# Patient Record
Sex: MALE | Race: WHITE | NOT HISPANIC OR LATINO
[De-identification: names, ages, dates, MRNs, and addresses within clinical notes are randomized per-mention and may not be internally consistent; named-entity substitution may affect disease eponyms.]

---

## 2017-11-09 ENCOUNTER — FORM ENCOUNTER (OUTPATIENT)
Age: 27
End: 2017-11-09

## 2017-11-10 ENCOUNTER — APPOINTMENT (OUTPATIENT)
Dept: UROLOGY | Facility: CLINIC | Age: 27
End: 2017-11-10
Payer: MEDICARE

## 2017-11-10 VITALS — OXYGEN SATURATION: 97 % | DIASTOLIC BLOOD PRESSURE: 58 MMHG | HEART RATE: 73 BPM | SYSTOLIC BLOOD PRESSURE: 104 MMHG

## 2017-11-10 DIAGNOSIS — N39.41 URGE INCONTINENCE: ICD-10-CM

## 2017-11-10 PROCEDURE — 99204 OFFICE O/P NEW MOD 45 MIN: CPT

## 2017-11-11 PROBLEM — N39.41 URGE INCONTINENCE OF URINE: Status: ACTIVE | Noted: 2017-11-11

## 2017-11-13 ENCOUNTER — APPOINTMENT (OUTPATIENT)
Dept: ULTRASOUND IMAGING | Facility: CLINIC | Age: 27
End: 2017-11-13

## 2017-11-13 LAB — BACTERIA UR CULT: NORMAL

## 2018-01-23 ENCOUNTER — APPOINTMENT (OUTPATIENT)
Dept: SURGERY | Facility: CLINIC | Age: 28
End: 2018-01-23
Payer: MEDICARE

## 2018-01-23 VITALS
TEMPERATURE: 97.8 F | HEART RATE: 75 BPM | DIASTOLIC BLOOD PRESSURE: 61 MMHG | SYSTOLIC BLOOD PRESSURE: 95 MMHG | RESPIRATION RATE: 16 BRPM | OXYGEN SATURATION: 98 %

## 2018-01-23 PROCEDURE — 46600 DIAGNOSTIC ANOSCOPY SPX: CPT

## 2018-01-23 PROCEDURE — 99214 OFFICE O/P EST MOD 30 MIN: CPT | Mod: 25

## 2018-01-23 RX ORDER — DOCUSATE SODIUM 100 MG/1
CAPSULE ORAL
Refills: 0 | Status: ACTIVE | COMMUNITY

## 2018-03-27 ENCOUNTER — CHART COPY (OUTPATIENT)
Age: 28
End: 2018-03-27

## 2018-05-08 ENCOUNTER — APPOINTMENT (OUTPATIENT)
Dept: ORTHOPEDIC SURGERY | Facility: CLINIC | Age: 28
End: 2018-05-08

## 2019-03-19 ENCOUNTER — APPOINTMENT (OUTPATIENT)
Dept: MRI IMAGING | Facility: CLINIC | Age: 29
End: 2019-03-19

## 2019-03-20 ENCOUNTER — APPOINTMENT (OUTPATIENT)
Dept: MRI IMAGING | Facility: IMAGING CENTER | Age: 29
End: 2019-03-20
Payer: MEDICARE

## 2019-03-20 ENCOUNTER — OUTPATIENT (OUTPATIENT)
Dept: OUTPATIENT SERVICES | Facility: HOSPITAL | Age: 29
LOS: 1 days | End: 2019-03-20
Payer: MEDICARE

## 2019-03-20 DIAGNOSIS — Z00.8 ENCOUNTER FOR OTHER GENERAL EXAMINATION: ICD-10-CM

## 2019-03-20 PROCEDURE — 73721 MRI JNT OF LWR EXTRE W/O DYE: CPT

## 2019-03-20 PROCEDURE — 73721 MRI JNT OF LWR EXTRE W/O DYE: CPT | Mod: 26,RT

## 2020-04-07 ENCOUNTER — APPOINTMENT (OUTPATIENT)
Dept: GASTROENTEROLOGY | Facility: CLINIC | Age: 30
End: 2020-04-07

## 2020-05-18 ENCOUNTER — APPOINTMENT (OUTPATIENT)
Dept: GASTROENTEROLOGY | Facility: CLINIC | Age: 30
End: 2020-05-18
Payer: MEDICARE

## 2020-05-18 PROCEDURE — 99443: CPT | Mod: 95

## 2021-12-16 ENCOUNTER — APPOINTMENT (OUTPATIENT)
Dept: OTOLARYNGOLOGY | Facility: CLINIC | Age: 31
End: 2021-12-16
Payer: MEDICARE

## 2021-12-16 VITALS
SYSTOLIC BLOOD PRESSURE: 100 MMHG | BODY MASS INDEX: 23.62 KG/M2 | HEIGHT: 70 IN | HEART RATE: 70 BPM | WEIGHT: 165 LBS | DIASTOLIC BLOOD PRESSURE: 64 MMHG

## 2021-12-16 DIAGNOSIS — H61.23 IMPACTED CERUMEN, BILATERAL: ICD-10-CM

## 2021-12-16 DIAGNOSIS — H93.299 OTHER ABNORMAL AUDITORY PERCEPTIONS, UNSPECIFIED EAR: ICD-10-CM

## 2021-12-16 PROCEDURE — 69210 REMOVE IMPACTED EAR WAX UNI: CPT

## 2021-12-16 PROCEDURE — 99204 OFFICE O/P NEW MOD 45 MIN: CPT | Mod: 25

## 2021-12-16 NOTE — PHYSICAL EXAM
[FreeTextEntry1] : non-verbal [de-identified] : CI AU  [de-identified] : not seen [Normal] : mucosa is normal [Midline] : trachea located in midline position

## 2021-12-16 NOTE — REVIEW OF SYSTEMS
[Anxiety] : anxiety [Negative] : Heme/Lymph [As Noted in HPI] : as noted in HPI [FreeTextEntry1] : daytime sleepiness; strawberry birthmark

## 2021-12-16 NOTE — HISTORY OF PRESENT ILLNESS
[de-identified] : 31 yr old male w autism seen with his mother. Was told at time of his physical that he has wax.\par Mother has been using H2O2\par -hx otitis, noise exp, head trauma, FH

## 2022-01-11 ENCOUNTER — APPOINTMENT (OUTPATIENT)
Dept: OTOLARYNGOLOGY | Facility: CLINIC | Age: 32
End: 2022-01-11

## 2022-11-23 ENCOUNTER — APPOINTMENT (OUTPATIENT)
Dept: UROLOGY | Facility: CLINIC | Age: 32
End: 2022-11-23

## 2022-11-23 ENCOUNTER — APPOINTMENT (OUTPATIENT)
Dept: ULTRASOUND IMAGING | Facility: CLINIC | Age: 32
End: 2022-11-23

## 2022-11-23 ENCOUNTER — OUTPATIENT (OUTPATIENT)
Dept: OUTPATIENT SERVICES | Facility: HOSPITAL | Age: 32
LOS: 1 days | End: 2022-11-23
Payer: MEDICARE

## 2022-11-23 DIAGNOSIS — Z00.8 ENCOUNTER FOR OTHER GENERAL EXAMINATION: ICD-10-CM

## 2022-11-23 PROCEDURE — 76770 US EXAM ABDO BACK WALL COMP: CPT

## 2022-11-23 PROCEDURE — 99442: CPT | Mod: 95

## 2022-11-23 PROCEDURE — 76770 US EXAM ABDO BACK WALL COMP: CPT | Mod: 26

## 2022-11-23 NOTE — HISTORY OF PRESENT ILLNESS
[FreeTextEntry1] : Telehealth appointment with both parents today\par Renal ultrasound results reviewed and discussed\par \par There is a 9 mm right upper pole calcification seen (pending final report)\par \par Plan\par CT stone ordered--- parents will call radiology to arrange appointment\par Can review results with telehealth appointment

## 2022-11-28 ENCOUNTER — APPOINTMENT (OUTPATIENT)
Dept: CT IMAGING | Facility: HOSPITAL | Age: 32
End: 2022-11-28

## 2022-11-28 ENCOUNTER — OUTPATIENT (OUTPATIENT)
Dept: OUTPATIENT SERVICES | Facility: HOSPITAL | Age: 32
LOS: 1 days | End: 2022-11-28
Payer: MEDICARE

## 2022-11-28 DIAGNOSIS — N20.0 CALCULUS OF KIDNEY: ICD-10-CM

## 2022-11-28 PROCEDURE — 74176 CT ABD & PELVIS W/O CONTRAST: CPT | Mod: 26,MH

## 2022-11-28 PROCEDURE — 74176 CT ABD & PELVIS W/O CONTRAST: CPT

## 2022-12-01 ENCOUNTER — APPOINTMENT (OUTPATIENT)
Dept: GASTROENTEROLOGY | Facility: CLINIC | Age: 32
End: 2022-12-01

## 2022-12-01 DIAGNOSIS — K59.09 OTHER CONSTIPATION: ICD-10-CM

## 2022-12-01 DIAGNOSIS — R93.5 ABNORMAL FINDINGS ON DIAGNOSTIC IMAGING OF OTHER ABDOMINAL REGIONS, INCLUDING RETROPERITONEUM: ICD-10-CM

## 2022-12-01 DIAGNOSIS — K62.5 HEMORRHAGE OF ANUS AND RECTUM: ICD-10-CM

## 2022-12-01 PROCEDURE — 99443: CPT | Mod: 95

## 2022-12-01 NOTE — REASON FOR VISIT
[Follow-up] : a follow-up of an existing diagnosis [Home] : at home, [unfilled] , at the time of the visit. 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

## 2022-12-01 NOTE — HISTORY OF PRESENT ILLNESS
[FreeTextEntry1] : The patient's mother (Mckenzie) requested a telephone visit today to review the patient's recent abnormal CT Abd.  The patient has a history of autistic disorder and is nonverbal so his mother provided all the history.\par \par Kan has been in his usual state of health per his mother.  He is not exhibiting any fever, nausea, or increased agitation.  He does not appear to be in any pain but it is difficult to assess.  When his twin brother (Carlos) was recently found to have a kidney stone, the patient underwent an US on November 23 which showed a possible 9 mm right kidney stone.  For further evaluation of the stone, he underwent a noncontrast CT which showed mild fatty infiltration of the mesentery with nonenlarged lymph nodes, which could be seen in mesenteric panniculitis.  His mother was advised to discuss the findings with GI.\par \par The patient has been eating normally.  He does take one fiber capsule daily and continues to experience constipation.  He does not drink enough water.  Of note, he recently had COVID-19 with fever and mild URI symptoms.
0 = understands/communicates without difficulty

## 2022-12-01 NOTE — ASSESSMENT
[FreeTextEntry1] : 1.  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  Patient asymptomatic.\par 2.  Chronic constipation with history of rectal bleeding due to anal fissure.\par 3.  Family history of indeterminate colitis (twin brother, Carlos) on Pentasa.\par 4.  Autistic disorder, averbal.\par \par Recs:\par - US report, CT images reviewed.\par - Diagnosis, significance, prognosis, and potential treatments for sclerosing mesenteritis discussed with mother.  It is highly possible that CT findings in otherwise asymptomatic patient was due to recent COVID infection and an incidental finding.  In the absence of fever, abdominal pain, or other GI symptoms, conservative management was recommended as findings may be self-limited.  \par - If patient does experience any new symptoms, will consider imaging with contrast.\par - Mother was advised to given patient 2 fiber capsules daily and to encourage more fluid intake.\par

## 2023-04-04 ENCOUNTER — APPOINTMENT (OUTPATIENT)
Dept: CARDIOLOGY | Facility: CLINIC | Age: 33
End: 2023-04-04

## 2023-06-06 ENCOUNTER — APPOINTMENT (OUTPATIENT)
Dept: GASTROENTEROLOGY | Facility: CLINIC | Age: 33
End: 2023-06-06

## 2023-11-29 ENCOUNTER — APPOINTMENT (OUTPATIENT)
Dept: ULTRASOUND IMAGING | Facility: CLINIC | Age: 33
End: 2023-11-29
Payer: MEDICARE

## 2023-11-29 ENCOUNTER — OUTPATIENT (OUTPATIENT)
Dept: OUTPATIENT SERVICES | Facility: HOSPITAL | Age: 33
LOS: 1 days | End: 2023-11-29
Payer: MEDICARE

## 2023-11-29 DIAGNOSIS — N20.0 CALCULUS OF KIDNEY: ICD-10-CM

## 2023-11-29 PROCEDURE — 76770 US EXAM ABDO BACK WALL COMP: CPT

## 2023-11-29 PROCEDURE — 76770 US EXAM ABDO BACK WALL COMP: CPT | Mod: 26

## 2023-12-06 ENCOUNTER — APPOINTMENT (OUTPATIENT)
Dept: UROLOGY | Facility: CLINIC | Age: 33
End: 2023-12-06
Payer: MEDICARE

## 2023-12-06 DIAGNOSIS — N20.0 CALCULUS OF KIDNEY: ICD-10-CM

## 2023-12-06 PROCEDURE — 99443: CPT | Mod: 95

## 2023-12-17 PROBLEM — N20.0 NEPHROLITHIASIS: Status: ACTIVE | Noted: 2022-11-23

## 2023-12-17 NOTE — HISTORY OF PRESENT ILLNESS
[FreeTextEntry1] : Telehealth appointment with both parents and patient today 34y/o man Autism, Family hx of kidney stones. His identical twin has been previously treated for stones Renal ultrasound results Nov 2023 reviewed and discussed  I personally reviewed images and discussed all pertinent findings with patient. There is a 7 mm right upper pole calcification stable--same as on renal sono from 11/2022. No stones seen on corresponding CT stone hunt also from Nov 2022.   Plan 12 months follow up renal ultrasound ordered to be done before visit

## 2024-02-21 ENCOUNTER — NON-APPOINTMENT (OUTPATIENT)
Age: 34
End: 2024-02-21

## 2024-02-21 ENCOUNTER — APPOINTMENT (OUTPATIENT)
Dept: INTERNAL MEDICINE | Facility: CLINIC | Age: 34
End: 2024-02-21

## 2024-02-21 VITALS — WEIGHT: 167 LBS | BODY MASS INDEX: 23.91 KG/M2 | HEIGHT: 70 IN

## 2024-02-21 NOTE — HISTORY OF PRESENT ILLNESS
[FreeTextEntry1] : new patient/physical [de-identified] : ROSALBA MILLER is a 33 year old male who presents for new patient comprehensive examination. On review of recent record, note:  -h/o Autism -h/o nephrolithiasis, monitored on yearly sono with urologist (Dr. Tolbert)

## 2024-03-28 ENCOUNTER — APPOINTMENT (OUTPATIENT)
Dept: INTERNAL MEDICINE | Facility: CLINIC | Age: 34
End: 2024-03-28
Payer: MEDICARE

## 2024-03-28 VITALS
DIASTOLIC BLOOD PRESSURE: 64 MMHG | SYSTOLIC BLOOD PRESSURE: 100 MMHG | OXYGEN SATURATION: 98 % | HEART RATE: 79 BPM | RESPIRATION RATE: 15 BRPM | BODY MASS INDEX: 23.34 KG/M2 | HEIGHT: 70 IN | WEIGHT: 163 LBS | TEMPERATURE: 98 F

## 2024-03-28 DIAGNOSIS — Z00.00 ENCOUNTER FOR GENERAL ADULT MEDICAL EXAMINATION W/OUT ABNORMAL FINDINGS: ICD-10-CM

## 2024-03-28 DIAGNOSIS — F41.9 ANXIETY DISORDER, UNSPECIFIED: ICD-10-CM

## 2024-03-28 DIAGNOSIS — F84.0 AUTISTIC DISORDER: ICD-10-CM

## 2024-03-28 PROCEDURE — G0439: CPT

## 2024-03-29 ENCOUNTER — NON-APPOINTMENT (OUTPATIENT)
Age: 34
End: 2024-03-29

## 2024-03-29 RX ORDER — ARIPIPRAZOLE 2 MG/1
2 TABLET ORAL
Refills: 0 | Status: ACTIVE | COMMUNITY
Start: 2024-03-29

## 2024-03-29 RX ORDER — VENLAFAXINE 75 MG/1
75 TABLET ORAL TWICE DAILY
Refills: 0 | Status: ACTIVE | COMMUNITY
Start: 2017-06-14

## 2024-03-29 RX ORDER — LORAZEPAM 2 MG/1
2 TABLET ORAL TWICE DAILY
Refills: 0 | Status: ACTIVE | COMMUNITY
Start: 2024-03-29

## 2024-03-29 RX ORDER — LAMOTRIGINE 50 MG/1
50 TABLET ORAL
Refills: 0 | Status: ACTIVE | COMMUNITY
Start: 2024-03-29

## 2024-03-30 LAB
CHOLEST SERPL-MCNC: 144 MG/DL
HDLC SERPL-MCNC: 41 MG/DL
LDLC SERPL CALC-MCNC: 92 MG/DL
NONHDLC SERPL-MCNC: 103 MG/DL
TRIGL SERPL-MCNC: 56 MG/DL
TSH SERPL-ACNC: 1.5 UIU/ML

## 2024-04-01 NOTE — HEALTH RISK ASSESSMENT
[de-identified] : patient is nonverbal [Language] : denies difficulty with language [Behavior] : denies difficulty with behavior [Learning/Retaining New Information] : denies difficulty learning/retaining new information [Handling Complex Tasks] : denies difficulty handling complex tasks [Reasoning] : denies difficulty with reasoning [Reports changes in vision] : Reports no changes in vision [Reports changes in dental health] : Reports no changes in dental health [de-identified] : with parents and brother

## 2024-04-01 NOTE — HISTORY OF PRESENT ILLNESS
[de-identified] : 32 yo M pmh autism, nonverbal, anxiety, OCD, catatonia presents to establish care and CPE Accompanied by mother, who provides history Per mother, patient is nonverbal. Was very happy and playful as a child however had traumatic event in school at triggered catatonia.  Runs often with dad. Patient understands simple commands. He is able to dress and feed himself on own, however needs assistance with toileting. Mother reports patient has good sleep.  Attends [employer]. Will possibly be rejoining group program with others who have autism.  Psychiatrist Dr Alberto Evangelista Gastro Dr Cerna  Cardiology Dr Lindsay  Urology Dr Tolbert   [FreeTextEntry1] : establish care and CPE

## 2024-04-01 NOTE — PLAN
[FreeTextEntry1] : Health Care Maintenance - A1c, cbc, cmp, vitamin d performed Feb 2024. Low vitamin d noted. Lipid panel and TSH ordered -- patient will go to lab for fasting bloodwork.  - declines influenza vaccine today - recommend annual skin cancer screening with Dermatologist - recommended annual eye exam with Ophthalmologist - recommended annual dental exam - h/o autism, nonverbal, anxiety, OCD, catatonia - continue lifestyle modifications - CPE in 1 year or sooner visit as needed  Anxiety, OCD, catatonia - lorazepam 4 mg BID, venlafaxine 75 mg BID, abilify 4 mg AM and 2 mg PM, lamictal 50 mg PM - continue follow up with psych

## 2024-04-17 ENCOUNTER — NON-APPOINTMENT (OUTPATIENT)
Age: 34
End: 2024-04-17

## 2024-04-17 DIAGNOSIS — Z11.1 ENCOUNTER FOR SCREENING FOR RESPIRATORY TUBERCULOSIS: ICD-10-CM

## 2024-04-23 ENCOUNTER — APPOINTMENT (OUTPATIENT)
Dept: SURGERY | Facility: CLINIC | Age: 34
End: 2024-04-23
Payer: MEDICARE

## 2024-04-23 DIAGNOSIS — K60.2 ANAL FISSURE, UNSPECIFIED: ICD-10-CM

## 2024-04-23 PROCEDURE — 99213 OFFICE O/P EST LOW 20 MIN: CPT

## 2024-04-23 NOTE — CONSULT LETTER
[Dear  ___] : Dear  [unfilled], [Consult Letter:] : I had the pleasure of evaluating your patient, [unfilled]. [Please see my note below.] : Please see my note below. [Consult Closing:] : Thank you very much for allowing me to participate in the care of this patient.  If you have any questions, please do not hesitate to contact me. [Sincerely,] : Sincerely, [FreeTextEntry3] : Marcus Melchor M.D., F.A.C.S, F.A.S.C.R.S [DrMajor  ___] : Dr. CARROLL

## 2024-04-23 NOTE — HISTORY OF PRESENT ILLNESS
[FreeTextEntry1] : Kan is a 34 y/o male being seen for consultation, anal fissure  Patient with h/o Autism.  Never had a colonoscopy.    Last seen 08/02/16 - In summary the patient has anal fissures and chronic constipation. I recommended fiber supplementation and stool softeners as well as topical Desitin paste. I instructed his parents to call me in one month. If his pain and bleeding persists I would recommend rectal examination under anesthesia and likely Botox injection. I explained to the patient's parents that if Botox and conservative therapy fail and the alternative would be an internal sphincterotomy. I explained the risks benefits and alternatives of each procedure.  Today pt's mother reports pt having chronic constipation, takes fiber choice, adding fiber in his diet and provide pt with lots of fluid.  Formed once every two days.  BRBPR on and off into toilet bowl and tp.   Has some swelling anal excess tissues.

## 2024-04-23 NOTE — ASSESSMENT
[FreeTextEntry1] : In summary the patient has a history of anal fissures and chronic constipation.  At the present time there is no acute abnormality on perianal inspection or digital rectal examination.  I recommended fiber supplementation or as needed MiraLAX as needed for soft bowel movements.  His mother sees occasional blood on the tissue with bowel movements.  If this worsens he should follow-up with GI for colonoscopy.  From my standpoint I only need to see him as needed.

## 2024-04-23 NOTE — PHYSICAL EXAM
[Normal Breath Sounds] : Normal breath sounds [Normal Heart Sounds] : normal heart sounds [No Rash or Lesion] : No rash or lesion [Calm] : calm [Abdomen Tenderness] : ~T No ~M abdominal tenderness [de-identified] : Perianal inspection and digital rectal examination are normal.  There is no fissure fistula or abscess.  There are mild nonbleeding internal hemorrhoids.  There is no palpable mass.  There are small perianal tags. [de-identified] : WNL [de-identified] : Non-verbal  [de-identified] : JEREMIL [de-identified] : WNL [de-identified] : WNL [de-identified] : as noted on HPI

## 2024-05-12 LAB
M TB IFN-G BLD-IMP: NEGATIVE
QUANTIFERON TB PLUS MITOGEN MINUS NIL: >10 IU/ML
QUANTIFERON TB PLUS NIL: 0.03 IU/ML
QUANTIFERON TB PLUS TB1 MINUS NIL: 0 IU/ML
QUANTIFERON TB PLUS TB2 MINUS NIL: 0 IU/ML

## 2024-07-02 ENCOUNTER — APPOINTMENT (OUTPATIENT)
Dept: INTERNAL MEDICINE | Facility: CLINIC | Age: 34
End: 2024-07-02

## 2024-07-02 DIAGNOSIS — Z00.129 ENCOUNTER FOR ROUTINE CHILD HEALTH EXAMINATION W/OUT ABNORMAL FINDINGS: ICD-10-CM

## 2024-07-02 PROCEDURE — 99213 OFFICE O/P EST LOW 20 MIN: CPT

## 2024-07-07 PROBLEM — Z00.129 PRE-SCHOOL HEALTH EXAMINATION: Status: ACTIVE | Noted: 2024-07-07

## 2024-07-16 ENCOUNTER — NON-APPOINTMENT (OUTPATIENT)
Age: 34
End: 2024-07-16

## 2024-07-16 ENCOUNTER — APPOINTMENT (OUTPATIENT)
Dept: PULMONOLOGY | Facility: CLINIC | Age: 34
End: 2024-07-16
Payer: MEDICARE

## 2024-07-16 PROCEDURE — 99203 OFFICE O/P NEW LOW 30 MIN: CPT

## 2024-09-18 ENCOUNTER — NON-APPOINTMENT (OUTPATIENT)
Age: 34
End: 2024-09-18

## 2024-10-04 LAB
APPEARANCE: CLEAR
BACTERIA: NEGATIVE /HPF
BILIRUBIN URINE: NEGATIVE
BLOOD URINE: NEGATIVE
CAST: 0 /LPF
COLOR: YELLOW
EPITHELIAL CELLS: 0 /HPF
GLUCOSE QUALITATIVE U: NEGATIVE MG/DL
KETONES URINE: NEGATIVE MG/DL
LEUKOCYTE ESTERASE URINE: NEGATIVE
MICROSCOPIC-UA: NORMAL
NITRITE URINE: NEGATIVE
PH URINE: 7
PROTEIN URINE: NEGATIVE MG/DL
RED BLOOD CELLS URINE: 0 /HPF
SPECIFIC GRAVITY URINE: 1.01
UROBILINOGEN URINE: 0.2 MG/DL
WHITE BLOOD CELLS URINE: 0 /HPF

## 2024-10-09 ENCOUNTER — APPOINTMENT (OUTPATIENT)
Dept: ULTRASOUND IMAGING | Facility: CLINIC | Age: 34
End: 2024-10-09
Payer: MEDICARE

## 2024-10-09 ENCOUNTER — OUTPATIENT (OUTPATIENT)
Dept: OUTPATIENT SERVICES | Facility: HOSPITAL | Age: 34
LOS: 1 days | End: 2024-10-09
Payer: MEDICARE

## 2024-10-09 DIAGNOSIS — N20.0 CALCULUS OF KIDNEY: ICD-10-CM

## 2024-10-09 PROCEDURE — 76770 US EXAM ABDO BACK WALL COMP: CPT | Mod: 26

## 2024-10-11 ENCOUNTER — APPOINTMENT (OUTPATIENT)
Dept: UROLOGY | Facility: CLINIC | Age: 34
End: 2024-10-11
Payer: MEDICARE

## 2024-10-11 DIAGNOSIS — F84.0 AUTISTIC DISORDER: ICD-10-CM

## 2024-10-11 DIAGNOSIS — N20.0 CALCULUS OF KIDNEY: ICD-10-CM

## 2024-10-11 DIAGNOSIS — N39.41 URGE INCONTINENCE: ICD-10-CM

## 2024-10-11 PROCEDURE — 99443: CPT | Mod: 93

## 2024-11-20 PROCEDURE — 76770 US EXAM ABDO BACK WALL COMP: CPT

## 2025-01-14 ENCOUNTER — APPOINTMENT (OUTPATIENT)
Dept: INTERNAL MEDICINE | Facility: CLINIC | Age: 35
End: 2025-01-14
Payer: MEDICARE

## 2025-01-14 DIAGNOSIS — F06.1 CATATONIC DISORDER DUE TO KNOWN PHYSIOLOGICAL CONDITION: ICD-10-CM

## 2025-01-14 DIAGNOSIS — F84.0 AUTISTIC DISORDER: ICD-10-CM

## 2025-01-14 DIAGNOSIS — Z02.89 ENCOUNTER FOR OTHER ADMINISTRATIVE EXAMINATIONS: ICD-10-CM

## 2025-01-14 DIAGNOSIS — Z00.129 ENCOUNTER FOR ROUTINE CHILD HEALTH EXAMINATION W/OUT ABNORMAL FINDINGS: ICD-10-CM

## 2025-01-14 PROCEDURE — 99214 OFFICE O/P EST MOD 30 MIN: CPT

## 2025-01-14 PROCEDURE — G2211 COMPLEX E/M VISIT ADD ON: CPT

## 2025-03-10 ENCOUNTER — TRANSCRIPTION ENCOUNTER (OUTPATIENT)
Age: 35
End: 2025-03-10

## 2025-03-19 ENCOUNTER — APPOINTMENT (OUTPATIENT)
Dept: INTERNAL MEDICINE | Facility: CLINIC | Age: 35
End: 2025-03-19
Payer: MEDICARE

## 2025-03-19 VITALS
TEMPERATURE: 98 F | BODY MASS INDEX: 23.34 KG/M2 | HEART RATE: 73 BPM | WEIGHT: 163 LBS | SYSTOLIC BLOOD PRESSURE: 102 MMHG | RESPIRATION RATE: 15 BRPM | HEIGHT: 70 IN | OXYGEN SATURATION: 98 % | DIASTOLIC BLOOD PRESSURE: 68 MMHG

## 2025-03-19 DIAGNOSIS — F84.0 AUTISTIC DISORDER: ICD-10-CM

## 2025-03-19 DIAGNOSIS — Z00.00 ENCOUNTER FOR GENERAL ADULT MEDICAL EXAMINATION W/OUT ABNORMAL FINDINGS: ICD-10-CM

## 2025-03-19 DIAGNOSIS — F41.9 ANXIETY DISORDER, UNSPECIFIED: ICD-10-CM

## 2025-03-19 PROCEDURE — G0439: CPT

## 2025-03-26 LAB
25(OH)D3 SERPL-MCNC: 34.6 NG/ML
ALBUMIN SERPL ELPH-MCNC: 4.7 G/DL
ALP BLD-CCNC: 74 U/L
ALT SERPL-CCNC: 26 U/L
ANION GAP SERPL CALC-SCNC: 11 MMOL/L
AST SERPL-CCNC: 19 U/L
BILIRUB SERPL-MCNC: 0.6 MG/DL
BUN SERPL-MCNC: 15 MG/DL
CALCIUM SERPL-MCNC: 9.1 MG/DL
CHLORIDE SERPL-SCNC: 103 MMOL/L
CHOLEST SERPL-MCNC: 157 MG/DL
CO2 SERPL-SCNC: 28 MMOL/L
CREAT SERPL-MCNC: 0.97 MG/DL
EGFRCR SERPLBLD CKD-EPI 2021: 105 ML/MIN/1.73M2
ESTIMATED AVERAGE GLUCOSE: 105 MG/DL
GLUCOSE SERPL-MCNC: 81 MG/DL
HBA1C MFR BLD HPLC: 5.3 %
HCT VFR BLD CALC: 44.2 %
HDLC SERPL-MCNC: 36 MG/DL
HGB BLD-MCNC: 15.1 G/DL
LDLC SERPL-MCNC: 104 MG/DL
MCHC RBC-ENTMCNC: 30.6 PG
MCHC RBC-ENTMCNC: 34.2 G/DL
MCV RBC AUTO: 89.7 FL
NONHDLC SERPL-MCNC: 121 MG/DL
PLATELET # BLD AUTO: 184 K/UL
POTASSIUM SERPL-SCNC: 3.9 MMOL/L
PROT SERPL-MCNC: 7 G/DL
RBC # BLD: 4.93 M/UL
RBC # FLD: 12.5 %
SODIUM SERPL-SCNC: 142 MMOL/L
TRIGL SERPL-MCNC: 90 MG/DL
TSH SERPL-ACNC: 1.77 UIU/ML
WBC # FLD AUTO: 4.46 K/UL

## 2025-04-08 ENCOUNTER — NON-APPOINTMENT (OUTPATIENT)
Age: 35
End: 2025-04-08

## 2025-05-20 ENCOUNTER — NON-APPOINTMENT (OUTPATIENT)
Age: 35
End: 2025-05-20